# Patient Record
Sex: MALE | Race: BLACK OR AFRICAN AMERICAN | NOT HISPANIC OR LATINO | URBAN - METROPOLITAN AREA
[De-identification: names, ages, dates, MRNs, and addresses within clinical notes are randomized per-mention and may not be internally consistent; named-entity substitution may affect disease eponyms.]

---

## 2018-05-03 ENCOUNTER — EMERGENCY (EMERGENCY)
Facility: HOSPITAL | Age: 53
LOS: 1 days | Discharge: HOME | End: 2018-05-03
Attending: EMERGENCY MEDICINE
Payer: SELF-PAY

## 2018-05-03 VITALS
HEIGHT: 64 IN | OXYGEN SATURATION: 97 % | DIASTOLIC BLOOD PRESSURE: 85 MMHG | HEART RATE: 84 BPM | RESPIRATION RATE: 16 BRPM | WEIGHT: 205.03 LBS | SYSTOLIC BLOOD PRESSURE: 146 MMHG | TEMPERATURE: 98 F

## 2018-05-03 PROCEDURE — 99283 EMERGENCY DEPT VISIT LOW MDM: CPT

## 2018-05-03 RX ORDER — LIDOCAINE 4 G/100G
1 CREAM TOPICAL ONCE
Qty: 0 | Refills: 0 | Status: COMPLETED | OUTPATIENT
Start: 2018-05-03 | End: 2018-05-03

## 2018-05-03 RX ORDER — IBUPROFEN 200 MG
600 TABLET ORAL ONCE
Qty: 0 | Refills: 0 | Status: COMPLETED | OUTPATIENT
Start: 2018-05-03 | End: 2018-05-03

## 2018-05-03 RX ORDER — KETOROLAC TROMETHAMINE 30 MG/ML
30 SYRINGE (ML) INJECTION ONCE
Qty: 0 | Refills: 0 | Status: DISCONTINUED | OUTPATIENT
Start: 2018-05-03 | End: 2018-05-03

## 2018-05-03 RX ADMIN — Medication 600 MILLIGRAM(S): at 16:27

## 2018-05-03 RX ADMIN — LIDOCAINE 1 PATCH: 4 CREAM TOPICAL at 16:27

## 2018-05-03 NOTE — ED ADULT TRIAGE NOTE - CHIEF COMPLAINT QUOTE
offloading a truck when he hurt his back 4 /27, reports lower right back pain, denies numbness or tingling

## 2018-05-03 NOTE — ED PROVIDER NOTE - MEDICAL DECISION MAKING DETAILS
edu lower back pian lateral non midline no direct trauma no feevr no bowel or bladder - no improvement w alleve - no neuro deficits presents w persistant pain - stregth 5/5 no saddle anesthesia ambulatory in ed -- analgesia and fu spine

## 2018-05-03 NOTE — ED ADULT NURSE NOTE - OBJECTIVE STATEMENT
52 yr old male to ED A&Ox4 presents with back pain s/p lifting boxes on 4/23.  Pt seen by pmd and placed on Flexiril with no relief. Pt denies any numbness or tingling to ext. No bowel or urinary dysfunction.  CHAN. +sensation to ext noted. =strength noted to ext. Skin warm, and dry.

## 2018-05-03 NOTE — ED PROVIDER NOTE - NS CPE EDP MUSC LUMBAR LOC
paraspinal right muscular tenderness w/ decrease ROM due to pain. (-) midline tenderness, negative straight leg test

## 2018-05-03 NOTE — ED PROVIDER NOTE - PROGRESS NOTE DETAILS
Patient medicated in ED and states he is feeling better. Patient advised to follow up with PCP and Spine center. Patient advised to continue w/ home medication, If symptoms worsen, return to ED. Pt is aox3, ambulatory w/ steady gait, stable for discharge.

## 2018-05-03 NOTE — ED PROVIDER NOTE - OBJECTIVE STATEMENT
Patient is 52 y.o male, deneis significant pmhx presents to ED c/o right sided back pain since 04/27. Pt states he was loading heavy material unto truck when he began to feel stiffness and pain to right side of back. Pt states he followed up with PCP and has been taking muscle relaxant and Aleve w/ mild relief. Pt denies numbness, weakness, dysuria, hematuria, fever, chills, chest pain, sob, dyspnea, abdominal pain, constipation, brbpr, melena or subjective neurosensory deficit.